# Patient Record
Sex: FEMALE | Race: WHITE | NOT HISPANIC OR LATINO
[De-identification: names, ages, dates, MRNs, and addresses within clinical notes are randomized per-mention and may not be internally consistent; named-entity substitution may affect disease eponyms.]

---

## 2021-06-23 ENCOUNTER — NON-APPOINTMENT (OUTPATIENT)
Age: 36
End: 2021-06-23

## 2021-06-23 PROBLEM — Z00.00 ENCOUNTER FOR PREVENTIVE HEALTH EXAMINATION: Status: ACTIVE | Noted: 2021-06-23

## 2021-06-30 ENCOUNTER — NON-APPOINTMENT (OUTPATIENT)
Age: 36
End: 2021-06-30

## 2021-06-30 ENCOUNTER — APPOINTMENT (OUTPATIENT)
Dept: BREAST CENTER | Facility: CLINIC | Age: 36
End: 2021-06-30
Payer: COMMERCIAL

## 2021-06-30 VITALS
WEIGHT: 125 LBS | BODY MASS INDEX: 23 KG/M2 | DIASTOLIC BLOOD PRESSURE: 66 MMHG | SYSTOLIC BLOOD PRESSURE: 108 MMHG | HEIGHT: 62 IN | HEART RATE: 71 BPM

## 2021-06-30 DIAGNOSIS — Z78.9 OTHER SPECIFIED HEALTH STATUS: ICD-10-CM

## 2021-06-30 DIAGNOSIS — Z72.89 OTHER PROBLEMS RELATED TO LIFESTYLE: ICD-10-CM

## 2021-06-30 DIAGNOSIS — N60.01 SOLITARY CYST OF RIGHT BREAST: ICD-10-CM

## 2021-06-30 DIAGNOSIS — Z91.89 OTHER SPECIFIED PERSONAL RISK FACTORS, NOT ELSEWHERE CLASSIFIED: ICD-10-CM

## 2021-06-30 DIAGNOSIS — D24.2 BENIGN NEOPLASM OF LEFT BREAST: ICD-10-CM

## 2021-06-30 DIAGNOSIS — N60.02 SOLITARY CYST OF RIGHT BREAST: ICD-10-CM

## 2021-06-30 PROCEDURE — 99072 ADDL SUPL MATRL&STAF TM PHE: CPT

## 2021-06-30 PROCEDURE — 99203 OFFICE O/P NEW LOW 30 MIN: CPT

## 2021-06-30 NOTE — REASON FOR VISIT
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

## 2021-06-30 NOTE — PAST MEDICAL HISTORY
[Menarche Age ____] : age at menarche was [unfilled] [Definite ___ (Date)] : the last menstrual period was [unfilled] [Total Preg ___] : G[unfilled] [FreeTextEntry5] : D& C [FreeTextEntry7] : Yes

## 2021-06-30 NOTE — CONSULT LETTER
[Consult Letter:] : I had the pleasure of evaluating your patient, [unfilled]. [Please see my note below.] : Please see my note below. [Consult Closing:] : Thank you very much for allowing me to participate in the care of this patient.  If you have any questions, please do not hesitate to contact me. [Sincerely,] : Sincerely, [FreeTextEntry2] : Dr. Karol Faria [FreeTextEntry3] : Best regards,\par  \par Roseanna Freeman, \par Breast Surgeon\par [facility]\par [facility]\par [address], 3rd floor\par \par [facility]\par (formerly known as [facility])\par [address] [facility], 8th floor\par \par [facility]\par [address], 2nd floor\par \[place] [address]\par Tel: [phone]\par Dot:[phone]\par Fax: [phone]\par Email: [email or web address]\par \par

## 2021-06-30 NOTE — HISTORY OF PRESENT ILLNESS
[FreeTextEntry1] : WEN is a 35 year old  female referred by Dr. Karol Faria (PCP) who presents for initial evaluation regarding a palpable abnormality of the left 3:00 4cmFN now s/p ultrasound guided biopsy on 6/22/2021 which pathology yielded a fibroadenoma. She first noted the palpable finding in December 2020. Denies any skin changes/dimpling, no nipple discharge bilaterally. \par \par She has a family history of breast cancer, her paternal aunt was diagnosed at age 45. No genetic testing has been completed. Discussed importance and implication of genetic testing in regards to her family history and offspring. Patient would like to go forward with genetic counselor consult.\par \par MELVIN lifetime risk of 21.2% \par \par She is due to start IVF treatment next week. She also states she plans on moving back to [place] (her home country) in October and wants to know what she should do as far as management moving forward given her family history. \par \par Of note, she is moving back to [place] in November. Discussed patients high risk status and recommendations for close surveillance. Patient understands and will establish care for a breast exam every 6 months. Discussed benefits of surveillance and well as implication of the sensitivity of breast MRI. Patient will inquire about MRI in [place] and will undergo mammogram/ultrasound annually. \par \par Discussed importance and implication of genetic testing in regards to her family history and offspring. Patient would like to go forward with genetic counselor consult.\par

## 2021-06-30 NOTE — DATA REVIEWED
[FreeTextEntry1] : 12/15/2020 (R) bilateral dx mammogram/US showing breasts are heterogeneously dense, regional microcalcifications are seen in the upper-outer left breast mid to posterior depth. Part of the calcifications are layering. A group of punctate calcifications are seen at about 6 o'clock 4.5cmFN. No suspicious finding is seen in the palpable area of the left breast. On US: left 3:00 4cmFN, circumscribed oval shaped hypoechoic nodule seen measuring 0.7 x 0.6 x 0.9cm, benign appearing nodule at palpable area of left breast, short interval follow up left breast US recommended, probably benign microcalcifications in left breast, short interval f/u left mammogram recommended. \par \par 6/16/2021 (R) left dx mammogram/US showing breasts are heterogeneously dense, regional calcifications are again seen in the upper outer quadrant of the left breast, with the majority of these calcifications layering on lateral views suggestive of benign milk of calcium. \par Right breast: 9:00, 2 cm from the nipple, new 0.6 x 0.5 x 0.3 cm ovoid parallel hypoechoic nodule\par 10:00, 1 cm from the nipple, new 0.6 x 0.3 x 0.7 cm ovoid parallel hypoechoic nodule\par 11:00, 1 cm from the nipple, new 0.6 x 0.2 x 0.6 cm benign-appearing cyst\par Retroareolar region, new 0.5 x 0.2 x 0.5 cm benign-appearing cyst\par Left breast: 3:00, 4 cm from the nipple, stable 1.0 x 0.6 x 0.9 cm heterogeneous hypoechoic mass.\par 6:00, 2 cm from the nipple, 0.6 x 0.1 x 0.6 cm benign-appearing cyst\par 7:00, 3 cm from the nipple, 0.4 x 0.2 x 0.4 cm benign-appearing cyst. Left breast mass at 3:00 given its heterogeneous echotexture and ill defined borders, US guided biopsy recommended. BIRADS 4 \par \par 6/22/2021 (R) left 3:00 4cmFN US guided biopsy pathology: fibroadenoma \par

## 2021-07-07 ENCOUNTER — APPOINTMENT (OUTPATIENT)
Dept: BREAST CENTER | Facility: CLINIC | Age: 36
End: 2021-07-07

## 2021-07-18 ENCOUNTER — FORM ENCOUNTER (OUTPATIENT)
Age: 36
End: 2021-07-18

## 2021-07-30 ENCOUNTER — NON-APPOINTMENT (OUTPATIENT)
Age: 36
End: 2021-07-30